# Patient Record
Sex: FEMALE
[De-identification: names, ages, dates, MRNs, and addresses within clinical notes are randomized per-mention and may not be internally consistent; named-entity substitution may affect disease eponyms.]

---

## 2017-03-21 ENCOUNTER — HOSPITAL ENCOUNTER (EMERGENCY)
Dept: HOSPITAL 31 - C.ER | Age: 32
Discharge: HOME | End: 2017-03-21
Payer: COMMERCIAL

## 2017-03-21 ENCOUNTER — HOSPITAL ENCOUNTER (EMERGENCY)
Dept: HOSPITAL 31 - C.ER | Age: 32
Discharge: HOME | End: 2017-03-21
Payer: MEDICAID

## 2017-03-21 VITALS
SYSTOLIC BLOOD PRESSURE: 111 MMHG | OXYGEN SATURATION: 99 % | TEMPERATURE: 97.9 F | RESPIRATION RATE: 18 BRPM | DIASTOLIC BLOOD PRESSURE: 66 MMHG | HEART RATE: 77 BPM

## 2017-03-21 VITALS
SYSTOLIC BLOOD PRESSURE: 112 MMHG | HEART RATE: 90 BPM | DIASTOLIC BLOOD PRESSURE: 67 MMHG | TEMPERATURE: 97.8 F | OXYGEN SATURATION: 98 %

## 2017-03-21 VITALS — BODY MASS INDEX: 18.8 KG/M2

## 2017-03-21 VITALS — RESPIRATION RATE: 16 BRPM

## 2017-03-21 DIAGNOSIS — O99.512: ICD-10-CM

## 2017-03-21 DIAGNOSIS — J45.909: ICD-10-CM

## 2017-03-21 DIAGNOSIS — Z3A.16: ICD-10-CM

## 2017-03-21 DIAGNOSIS — F32.89: ICD-10-CM

## 2017-03-21 DIAGNOSIS — O99.512: Primary | ICD-10-CM

## 2017-03-21 DIAGNOSIS — O23.42: Primary | ICD-10-CM

## 2017-03-21 DIAGNOSIS — O99.342: ICD-10-CM

## 2017-03-21 DIAGNOSIS — J06.9: ICD-10-CM

## 2017-03-21 LAB
BACTERIA #/AREA URNS HPF: (no result) /[HPF]
BILIRUB UR-MCNC: NEGATIVE MG/DL
GLUCOSE UR STRIP-MCNC: NORMAL MG/DL
KETONES UR STRIP-MCNC: (no result) MG/DL
LEUKOCYTE ESTERASE UR-ACNC: (no result) LEU/UL
PH UR STRIP: 6 [PH] (ref 5–8)
PROT UR STRIP-MCNC: NEGATIVE MG/DL
RBC # UR STRIP: NEGATIVE /UL
RBC #/AREA URNS HPF: 3 /HPF (ref 0–3)
SP GR UR STRIP: 1.02 (ref 1–1.03)
TRANS CELLS #/AREA URNS HPF: < 1 /HPF (ref 0–3)
UROBILINOGEN UR-MCNC: 2 MG/DL (ref 0.2–1)
WBC #/AREA URNS HPF: 8 /HPF (ref 0–5)

## 2017-03-21 NOTE — C.PDOC
History Of Present Illness


30 y/o female presents to ER with c/o of dry cough x 3 days. Pt took cough 

drops and home remedies with no relief, now c/o of chest congestion and chest 

tightness. Pt ran out of albuterol inhaler. Pt is 16 week pregnant. Denies SOB, 

calf pain, fever  


Time Seen by Provider: 03/21/17 01:25


Chief Complaint (Nursing): Cough, Cold, Congestion


History Per: Patient


History/Exam Limitations: no limitations


Current Symptoms Are (Timing): Still Present


Sick Contacts (Context): None


Associated Symptoms: Sore Throat, Cough.  denies: Sinus Drainage, Nasal 

Congestion, Vomiting


Ear Symptoms: Bilateral: None


Severity: Moderate





Past Medical History


Vital Signs: 





 Last Vital Signs











Temp  98.7 F   03/21/17 01:21


 


Pulse  72   03/21/17 01:21


 


Resp  16   03/21/17 01:21


 


BP  107/68   03/21/17 01:21


 


Pulse Ox  96   03/21/17 01:21














- Medical History


PMH: Anxiety, Asthma, Bipolar Disorder, Depression, Sexually Transmitted Disease


   Denies: HIV, HTN, Seizures





- [facility] Procedures











INDIVID PSYCHOTHERAP NEC (06/26/14)


OTHER GROUP THERAPY (06/26/14)


PSYCHIAT DRUG THERAP NEC (06/26/14)








Family History: States: Diabetes





- Social History


Hx Tobacco Use: No


Hx Alcohol Use: No


Hx Substance Use: No





- Immunization History


Hx Tetanus Toxoid Vaccination: No


Hx Influenza Vaccination: No


Hx Pneumococcal Vaccination: No





Review Of Systems


Constitutional: Negative for: Fever, Chills


Respiratory: Positive for: Cough.  Negative for: Shortness of Breath, Wheezing


Gastrointestinal: Negative for: Vomiting, Abdominal Pain





Physical Exam





- Physical Exam


Appears: Well, Non-toxic, No Acute Distress


Skin: Normal Color


Eye(s): bilateral: Normal Inspection, PERRL


Nose: Normal


Throat: Normal, No Erythema


Chest: Symmetrical


Cardiovascular: Rhythm Regular, No Murmur


Respiratory: Decreased Breath Sounds (minimal), No Rhonchi, Wheezing (exp 

scattered)


Gastrointestinal/Abdominal: Normal Exam (gravid), No Tenderness


Neurological/Psych: Oriented x3





ED Course And Treatment


O2 Sat by Pulse Oximetry: 96





Disposition


Counseled Patient/Family Regarding: Diagnosis, Need For Followup, Rx Given





- Disposition


Disposition: HOME/ ROUTINE


Disposition Time: 02:05


Condition: STABLE


Additional Instructions: 


Use albuterol inhaler as needed 





Take meds as directed





Follow up with your doctor 





Return to ER if worse 


Prescriptions: 


Albuterol HFA [Ventolin HFA 90 mcg/actuation (8 g)] 2 puff IH [id number] #1 inhaler


predniSONE [Prednisone] 40 mg PO DAILY #10 tab


Instructions:  Upper Respiratory Infection (ED)





- Clinical Impression


Clinical Impression: 


 Upper respiratory infection, Asthma, mild

## 2017-03-21 NOTE — C.PDOC
History Of Present Illness


32 y/o female 4 months pregnant, , was seen in ED overnight for asthma, SOB 

and wheezing. Pt felt better and was sent home with albuterol and prednisone. 

Today patient was woken up by police who came to her home to get her ex-

boyfriend, patient became anxious, SOB and had pelvic cramping. Pt states "I'm 

worried about the baby" and also notes pressure when urinating. Pt denies chest 

pain, vaginal bleeding, vomiting, diarrhea, fever, dysuria or any other 

complaints. 


Time Seen by Provider: 17 17:08


Chief Complaint (Nursing): Shortness Of Breath


History Per: Patient


History/Exam Limitations: no limitations


Onset/Duration Of Symptoms: Hrs


Current Symptoms Are (Timing): Still Present


Severity: Mild


Associated Symptoms: denies: Fever


Recent travel outside of the [place]: No





Past Medical History


Reviewed: Historical Data, Nursing Documentation, Vital Signs


Vital Signs: 


 Last Vital Signs











Temp  97.8 F   17 17:00


 


Pulse  90   17 17:00


 


Resp  16   17 17:15


 


BP  112/67   17 17:00


 


Pulse Ox  98   17 18:14














- Medical History


PMH: Anxiety, Asthma, Bipolar Disorder, Depression, Sexually Transmitted Disease





- [facility] Procedures








INDIVID PSYCHOTHERAP NEC (14)


OTHER GROUP THERAPY (14)


PSYCHIAT DRUG THERAP NEC (14)








Family History: States: Diabetes





- Social History


Hx Tobacco Use: No


Hx Alcohol Use: No


Hx Substance Use: No





- Immunization History


Hx Tetanus Toxoid Vaccination: No


Hx Influenza Vaccination: No


Hx Pneumococcal Vaccination: No





Review Of Systems


Except As Marked, All Systems Reviewed And Found Negative.


Constitutional: Negative for: Fever, Chills


Cardiovascular: Negative for: Chest Pain


Respiratory: Positive for: Shortness of Breath


Gastrointestinal: Positive for: Abdominal Pain.  Negative for: Vomiting, 

Diarrhea


Genitourinary: Negative for: Dysuria, Vaginal Discharge, Vaginal Bleeding


Psych: Positive for: Anxiety





Physical Exam





- Physical Exam


Appears: Non-toxic, No Acute Distress, Other (speaking in full sentences)


Skin: Warm, Dry, No Rash


Head: Atraumatic, Normacephalic


Nose: Normal


Neck: Normal ROM


Chest: Symmetrical


Cardiovascular: Rhythm Regular, No Murmur


Respiratory: Normal Breath Sounds, No Rales, No Rhonchi, No Wheezing


Gastrointestinal/Abdominal: No Tenderness, Other (Gravid, fundus 2 cm below 

umbilicus)


Extremity: Bilateral: Atraumatic


Neurological/Psych: Oriented x3





ED Course And Treatment


O2 Sat by Pulse Oximetry: 98 (on room air)


Pulse Ox Interpretation: Normal


Progress Note: Plan: ua, albuterol, fetal heart tones





Disposition


Counseled Patient/Family Regarding: Diagnosis, Need For Followup, Rx Given





- Disposition


Referrals: 


Stalin Hsu [Staff Provider] - 


Disposition: HOME/ ROUTINE


Disposition Time: 19:05


Condition: STABLE


Additional Instructions: 


FOLLOW UP WITH YOUR OB/GYN WITHIN 1 WEEK





USE ASTHMA MEDICATIONS GIVEN TO YOU DURING EARLIER VISIT





RETURN TO ER IF SYMPTOMS WORSEN


Prescriptions: 


Nitrofurantoin Macrocrystals [Macrobid] 1 cap PO BID #14 cap


Instructions:  Asthma (ED), Urinary Tract Infection in Pregnancy (ED)


Print Language: ENGLISH





- Clinical Impression


Clinical Impression: 


 UTI in pregnancy, Asthma, mild





- Scribe Statement


The provider has reviewed the documentation as recorded by the Rachel Martel


Provider Attestation: 





All medical record entries made by the Rachel were at my direction and 

personally dictated by me. I have reviewed the chart and agree that the record 

accurately reflects my personal performance of the history, physical exam, 

medical decision making, and the department course for this patient. I have 

also personally directed, reviewed, and agree with the discharge instructions 

and disposition.

## 2017-04-06 ENCOUNTER — HOSPITAL ENCOUNTER (EMERGENCY)
Dept: HOSPITAL 31 - C.ER | Age: 32
Discharge: HOME | End: 2017-04-06
Payer: COMMERCIAL

## 2017-04-06 VITALS
SYSTOLIC BLOOD PRESSURE: 110 MMHG | HEART RATE: 90 BPM | RESPIRATION RATE: 16 BRPM | OXYGEN SATURATION: 98 % | DIASTOLIC BLOOD PRESSURE: 70 MMHG

## 2017-04-06 VITALS — TEMPERATURE: 98.2 F

## 2017-04-06 VITALS — BODY MASS INDEX: 18.8 KG/M2

## 2017-04-06 DIAGNOSIS — R42: Primary | ICD-10-CM

## 2017-04-06 LAB
ALBUMIN/GLOB SERPL: 1.2 {RATIO} (ref 1–2.1)
ALP SERPL-CCNC: 55 U/L (ref 38–126)
ALT SERPL-CCNC: 14 U/L (ref 9–52)
AST SERPL-CCNC: 31 U/L (ref 14–36)
BACTERIA #/AREA URNS HPF: (no result) /[HPF]
BASOPHILS # BLD AUTO: 0 K/UL (ref 0–0.2)
BASOPHILS NFR BLD: 0.6 % (ref 0–2)
BILIRUB SERPL-MCNC: 0.4 MG/DL (ref 0.2–1.3)
BILIRUB UR-MCNC: NEGATIVE MG/DL
BUN SERPL-MCNC: 9 MG/DL (ref 7–17)
CALCIUM SERPL-MCNC: 8.5 MG/DL (ref 8.6–10.4)
CHLORIDE SERPL-SCNC: 100 MMOL/L (ref 98–107)
CO2 SERPL-SCNC: 24 MMOL/L (ref 22–30)
EOSINOPHIL # BLD AUTO: 0.2 K/UL (ref 0–0.7)
EOSINOPHIL NFR BLD: 2.5 % (ref 0–4)
ERYTHROCYTE [DISTWIDTH] IN BLOOD BY AUTOMATED COUNT: 12.4 % (ref 11.5–14.5)
GLOBULIN SER-MCNC: 2.9 GM/DL (ref 2.2–3.9)
GLUCOSE SERPL-MCNC: 74 MG/DL (ref 65–105)
GLUCOSE UR STRIP-MCNC: NORMAL MG/DL
HCT VFR BLD CALC: 33.9 % (ref 34–47)
KETONES UR STRIP-MCNC: NEGATIVE MG/DL
LEUKOCYTE ESTERASE UR-ACNC: (no result) LEU/UL
LYMPHOCYTES # BLD AUTO: 1.7 K/UL (ref 1–4.3)
LYMPHOCYTES NFR BLD AUTO: 26.9 % (ref 20–40)
MCH RBC QN AUTO: 30.6 PG (ref 27–31)
MCHC RBC AUTO-ENTMCNC: 33.9 G/DL (ref 33–37)
MCV RBC AUTO: 90.4 FL (ref 81–99)
MONOCYTES # BLD: 0.6 K/UL (ref 0–0.8)
MONOCYTES NFR BLD: 9 % (ref 0–10)
NRBC BLD AUTO-RTO: 0 % (ref 0–2)
PH UR STRIP: 7 [PH] (ref 5–8)
PLATELET # BLD: 246 K/UL (ref 130–400)
PMV BLD AUTO: 7.9 FL (ref 7.2–11.7)
POTASSIUM SERPL-SCNC: 3.9 MMOL/L (ref 3.6–5.2)
PROT SERPL-MCNC: 6.5 G/DL (ref 6.3–8.3)
PROT UR STRIP-MCNC: NEGATIVE MG/DL
RBC # UR STRIP: NEGATIVE /UL
RBC #/AREA URNS HPF: < 1 /HPF (ref 0–3)
SODIUM SERPL-SCNC: 136 MMOL/L (ref 132–148)
SP GR UR STRIP: 1.01 (ref 1–1.03)
UROBILINOGEN UR-MCNC: 2 MG/DL (ref 0.2–1)
WBC # BLD AUTO: 6.4 K/UL (ref 4.8–10.8)
WBC #/AREA URNS HPF: 4 /HPF (ref 0–5)

## 2017-04-06 PROCEDURE — 80053 COMPREHEN METABOLIC PANEL: CPT

## 2017-04-06 PROCEDURE — 85025 COMPLETE CBC W/AUTO DIFF WBC: CPT

## 2017-04-06 PROCEDURE — 82948 REAGENT STRIP/BLOOD GLUCOSE: CPT

## 2017-04-06 PROCEDURE — 84702 CHORIONIC GONADOTROPIN TEST: CPT

## 2017-04-06 PROCEDURE — 99285 EMERGENCY DEPT VISIT HI MDM: CPT

## 2017-04-06 PROCEDURE — 81001 URINALYSIS AUTO W/SCOPE: CPT

## 2017-04-06 NOTE — C.PDOC
History Of Present Illness


Pt presents with episodes of dizziness over the last few days. Occasionally get 

them when she stands, or walk,. No visual changes.,, no slurred speech. neuro 

non  focal


Time Seen by Provider: 04/06/17 19:51


Chief Complaint (Nursing): Dizziness/Lightheaded


History Per: Patient


History/Exam Limitations: no limitations


Onset/Duration Of Symptoms: Days


Current Symptoms Are (Timing): Still Present (but intermittent)


Activity At Onset Of Symptoms: Sitting, Standing, Walking, Change In Head 

Position


Fall Associated With With Symptoms: No


Severity: Mild


Pain Scale Rating Of: 3


Recent travel outside of the [place]: No


Additional History Per: Patient





- Symptoms Of CVA


Associated Symptoms: denies: Impaired Speech, Seizure Activity


Recent Aspirin Use: No


Current Coumadin Use?: No


Recent Head Trauma: No





Past Medical History


Reviewed: Historical Data, Nursing Documentation, Vital Signs


Vital Signs: 


 Last Vital Signs











Temp  98.2 F   04/06/17 18:39


 


Pulse  89   04/06/17 18:39


 


Resp  19   04/06/17 18:39


 


BP  109/72   04/06/17 18:39


 


Pulse Ox  100   04/06/17 20:13














- Medical History


PMH: Anxiety, Asthma, Bipolar Disorder, Depression, Sexually Transmitted Disease


   Denies: Diabetes, Hepatitis, HIV, HTN, Seizures





- [facility] Procedures








INDIVID PSYCHOTHERAP NEC (06/26/14)


OTHER GROUP THERAPY (06/26/14)


PSYCHIAT DRUG THERAP NEC (06/26/14)








Family History: States: Diabetes





- Social History


Hx Tobacco Use: No


Hx Alcohol Use: No


Hx Substance Use: No





- Immunization History


Hx Tetanus Toxoid Vaccination: No


Hx Influenza Vaccination: No


Hx Pneumococcal Vaccination: No





Review Of Systems


Constitutional: Negative for: Fever, Chills


ENT: Negative for: Throat Pain


Cardiovascular: Negative for: Chest Pain, Palpitations


Respiratory: Negative for: Shortness of Breath


Gastrointestinal: Negative for: Nausea, Vomiting, Abdominal Pain


Genitourinary: Negative for: Dysuria


Musculoskeletal: Negative for: Back Pain


Skin: Negative for: Rash, Lesions, Jaundice, Bruising


Neurological: Positive for: Dizziness.  Negative for: Weakness


Psych: Negative for: Anxiety





Physical Exam





- Physical Exam


Appears: Non-toxic, No Acute Distress


Skin: Warm, Dry


Eye(s): bilateral: Normal Inspection, PERRL, EOMI


Oral Mucosa: Dry


Chest: Symmetrical


Cardiovascular: Rhythm Regular


Respiratory: No Rales, No Rhonchi, No Wheezing


Gastrointestinal/Abdominal: Soft, No Tenderness, Distention (gravid to xyfoid)


Back: Normal Inspection


Extremity: Normal ROM


Extremity: Bilateral: Atraumatic, Normal Color And Temperature


Neurological/Psych: Oriented x3, Normal Speech, Normal Cognition


Gait: Steady





ED Course And Treatment





- Laboratory Results


Result Diagrams: 


 04/06/17 20:24





 04/06/17 19:51


ECG: Interpreted By Me, Viewed By Me


ECG Rhythm: Sinus Rhythm (78), Nonspecific Changes


O2 Sat by Pulse Oximetry: 100


Pulse Ox Interpretation: Normal


Progress Note: blood work , ivf


Reevaluation Time: 22:03


Reassessment Condition: Improved





Medical Decision Making


Medical Decision Making: 


Upon provider reevaluation patient is feeling better, is medically stable, and 

requires no further treatment in the ED at this time. Patient will be 

discharged home . Counseling was provided and all questions were answered 

regarding diagnosis and need for follow up with Dr. Hsu. There is 

agreement to discharge plan. Return if symptoms persist or worsen.





Disposition


Counseled Patient/Family Regarding: Studies Performed, Diagnosis, Need For 

Followup





- Disposition


Referrals: 


Stalin Hsu [Staff Provider] - 


Disposition: HOME/ ROUTINE


Disposition Time: 19:51


Condition: FAIR


Instructions:  Dizziness (ED), Pregnancy (ED)





- Clinical Impression


Clinical Impression: 


 Dizziness

## 2017-04-10 NOTE — CARD
--------------- APPROVED REPORT --------------





EKG Measurement

Heart [id number]

ME [id number]

[id number]

[id number]

[id number]



<Conclusion>

Normal sinus rhythm

Early repolarization

Normal ECG

## 2018-10-08 ENCOUNTER — HOSPITAL ENCOUNTER (EMERGENCY)
Dept: HOSPITAL 31 - C.ER | Age: 33
Discharge: HOME | End: 2018-10-08
Payer: COMMERCIAL

## 2018-10-08 VITALS — RESPIRATION RATE: 14 BRPM | TEMPERATURE: 97.8 F

## 2018-10-08 VITALS — SYSTOLIC BLOOD PRESSURE: 122 MMHG | DIASTOLIC BLOOD PRESSURE: 66 MMHG | HEART RATE: 66 BPM

## 2018-10-08 VITALS — OXYGEN SATURATION: 97 %

## 2018-10-08 VITALS — BODY MASS INDEX: 18.8 KG/M2

## 2018-10-08 DIAGNOSIS — F41.9: ICD-10-CM

## 2018-10-08 DIAGNOSIS — J45.909: ICD-10-CM

## 2018-10-08 DIAGNOSIS — I31.9: Primary | ICD-10-CM

## 2018-10-08 LAB
ALBUMIN SERPL-MCNC: 4.6 G/DL (ref 3.5–5)
ALBUMIN/GLOB SERPL: 1.7 {RATIO} (ref 1–2.1)
ALT SERPL-CCNC: 23 U/L (ref 9–52)
AST SERPL-CCNC: 18 U/L (ref 14–36)
BASOPHILS # BLD AUTO: 0 K/UL (ref 0–0.2)
BASOPHILS NFR BLD: 0.6 % (ref 0–2)
BILIRUB UR-MCNC: NEGATIVE MG/DL
BUN SERPL-MCNC: 22 MG/DL (ref 7–17)
CALCIUM SERPL-MCNC: 9.5 MG/DL (ref 8.6–10.4)
CK MB SERPL-MCNC: 0.69 NG/ML (ref 0–3.38)
EOSINOPHIL # BLD AUTO: 0.1 K/UL (ref 0–0.7)
EOSINOPHIL NFR BLD: 2.9 % (ref 0–4)
ERYTHROCYTE [DISTWIDTH] IN BLOOD BY AUTOMATED COUNT: 13 % (ref 11.5–14.5)
GFR NON-AFRICAN AMERICAN: > 60
GLUCOSE UR STRIP-MCNC: NORMAL MG/DL
HCG,QUALITATIVE URINE: NEGATIVE
HGB BLD-MCNC: 12.2 G/DL (ref 11–16)
LEUKOCYTE ESTERASE UR-ACNC: (no result) LEU/UL
LYMPHOCYTES # BLD AUTO: 2 K/UL (ref 1–4.3)
LYMPHOCYTES NFR BLD AUTO: 46.1 % (ref 20–40)
MCH RBC QN AUTO: 31.3 PG (ref 27–31)
MCHC RBC AUTO-ENTMCNC: 34.7 G/DL (ref 33–37)
MCV RBC AUTO: 90.1 FL (ref 81–99)
MONOCYTES # BLD: 0.3 K/UL (ref 0–0.8)
MONOCYTES NFR BLD: 6.5 % (ref 0–10)
NEUTROPHILS # BLD: 1.9 K/UL (ref 1.8–7)
NEUTROPHILS NFR BLD AUTO: 43.9 % (ref 50–75)
NRBC BLD AUTO-RTO: 0.3 % (ref 0–2)
PH UR STRIP: 6 [PH] (ref 5–8)
PLATELET # BLD: 311 K/UL (ref 130–400)
PMV BLD AUTO: 8 FL (ref 7.2–11.7)
PROT UR STRIP-MCNC: NEGATIVE MG/DL
RBC # BLD AUTO: 3.9 MIL/UL (ref 3.8–5.2)
RBC # UR STRIP: (no result) /UL
SP GR UR STRIP: 1.01 (ref 1–1.03)
UROBILINOGEN UR-MCNC: NORMAL MG/DL (ref 0.2–1)
WBC # BLD AUTO: 4.4 K/UL (ref 4.8–10.8)

## 2018-10-08 PROCEDURE — 99285 EMERGENCY DEPT VISIT HI MDM: CPT

## 2018-10-08 PROCEDURE — 85025 COMPLETE CBC W/AUTO DIFF WBC: CPT

## 2018-10-08 PROCEDURE — 82550 ASSAY OF CK (CPK): CPT

## 2018-10-08 PROCEDURE — 84484 ASSAY OF TROPONIN QUANT: CPT

## 2018-10-08 PROCEDURE — 96374 THER/PROPH/DIAG INJ IV PUSH: CPT

## 2018-10-08 PROCEDURE — 81001 URINALYSIS AUTO W/SCOPE: CPT

## 2018-10-08 PROCEDURE — 84703 CHORIONIC GONADOTROPIN ASSAY: CPT

## 2018-10-08 PROCEDURE — 82553 CREATINE MB FRACTION: CPT

## 2018-10-08 PROCEDURE — 93005 ELECTROCARDIOGRAM TRACING: CPT

## 2018-10-08 PROCEDURE — 80053 COMPREHEN METABOLIC PANEL: CPT

## 2018-10-08 PROCEDURE — 71045 X-RAY EXAM CHEST 1 VIEW: CPT

## 2018-10-08 NOTE — C.PDOC
History Of Present Illness


34 y/o F c PMHx asthma, pericarditis p/w chest pain x 2 days. States feels 

similar to pericarditis in the past. Midsternal, radiates to back and L arm 

associated with fatigue. Reports shortness of breath on exertion. Denies fever, 

nausea, vomiting, constipation, diarrhea. Currently breastfeeding. On menstrual 

period. 


Time Seen by Provider: 10/08/18 20:50


Chief Complaint (Nursing): Chest Pain


History Per: Patient


History/Exam Limitations: no limitations


Onset/Duration Of Symptoms: Days


Current Symptoms Are (Timing): Still Present





Past Medical History


Reviewed: Historical Data, Nursing Documentation, Vital Signs


Vital Signs: 





                                Last Vital Signs











Temp  97.8 F   10/08/18 20:18


 


Pulse  84   10/08/18 20:18


 


Resp  14   10/08/18 20:18


 


BP  120/79   10/08/18 20:18


 


Pulse Ox  97   10/08/18 20:18














- Medical History


PMH: Anxiety, Asthma, Bipolar Disorder, Depression, Pericarditis, Sexually 

Transmitted Disease


   Denies: Diabetes, Hepatitis, HIV, HTN, Seizures





- [facility] Procedures











INDIVID PSYCHOTHERAP NEC (06/26/14)


OTHER GROUP THERAPY (06/26/14)


PSYCHIAT DRUG THERAP NEC (06/26/14)








Family History: States: Diabetes





- Social History


Hx Tobacco Use: No


Hx Alcohol Use: No


Hx Substance Use: No





- Immunization History


Hx Tetanus Toxoid Vaccination: No


Hx Influenza Vaccination: No


Hx Pneumococcal Vaccination: No





Review Of Systems


Except As Marked, All Systems Reviewed And Found Negative.


Constitutional: Negative for: Fever


Cardiovascular: Positive for: Chest Pain


Respiratory: Positive for: SOB with Excertion


Gastrointestinal: Negative for: Nausea, Vomiting





Physical Exam





- Physical Exam


Additional Physical Exam Comments: 


Constitutional: No hypotension. No acute distress.


Head: Normocephalic. Atraumatic.


Eyes: PERRL.


ENT: Moist mucous membranes.


Neck: Supple. No JVD.


Cardiovascular: Regular rate. No murmurs. No extra heart sounds. No muffled 

heart sounds. Radial pulse 2+ bilaterally.


Chest: No tenderness.


Respiratory: Clear to auscultation bilaterally.


GI: Soft. Nontender.


Back: No CVA tenderness.


Musculoskeletal: No tenderness or swelling of extremities.


Skin: No rash.


Neurologic: Alert, no focal deficit.

















ED Course And Treatment





- Laboratory Results


Result Diagrams: 


                                 10/08/18 21:33





                                 10/08/18 21:33


O2 Sat by Pulse Oximetry: 97 (RA)


Pulse Ox Interpretation: Normal





Medical Decision Making


Medical Decision Making: 





Trop/CK, EKG. 


NSAIDs. 





EKG NSR 70 bpm, ST elevations diffusely with [place] depressions.





CXR no acute disease. No cardiomegaly.





Bedside US shows no pericardial effusion.





Patient states she feels much better after toradol administration.





Discharged home, f/u PMD and cardiology, continue NSAIDs, instructed to return 

to ED for worsening pain, fever, dyspnea, or any other problem.





Disposition





- Disposition


Referrals: 


Zandra Franco MD [Staff Provider] - 


Disposition: HOME/ ROUTINE


Disposition Time: 22:43


Condition: STABLE


Prescriptions: 


Ibuprofen [Motrin] 1 tab PO Q6 #30 tab


Instructions:  Pericarditis in Adults


Forms:  [facility] (English)





- Clinical Impression


Clinical Impression: 


 Pericarditis

## 2018-10-09 NOTE — RAD
HISTORY:

 chest pain 



COMPARISON:

Chest x-ray performed 5/17/16 



TECHNIQUE:

Chest, one view.



FINDINGS:





LUNGS:

No focal consolidation.



Scattered tiny probable calcified granulomas. 



Please note that chest x-ray has limited sensitivity for the 

detection of pulmonary masses.



PLEURA:

No significant pleural effusion identified. No definite pneumothorax .



CARDIOVASCULAR:

The cardiomediastinal silhouette appears within normal limits of 

size. 



OSSEOUS STRUCTURES:

No acute osseous abnormality identified.



VISUALIZED UPPER ABDOMEN:

Unremarkable.



OTHER FINDINGS:

None.



IMPRESSION:

No acute findings.  See above.

## 2018-10-10 NOTE — CARD
--------------- APPROVED REPORT --------------





Date of service: 10/08/2018



EKG Measurement

Heart [id number]

ID [id number]

[id number]

[id number]

[id number]



<Conclusion>

Normal sinus rhythm

Possible Left atrial enlargement

Early repolarization

Borderline ECG